# Patient Record
Sex: FEMALE | Race: OTHER | HISPANIC OR LATINO
[De-identification: names, ages, dates, MRNs, and addresses within clinical notes are randomized per-mention and may not be internally consistent; named-entity substitution may affect disease eponyms.]

---

## 2017-08-08 ENCOUNTER — APPOINTMENT (OUTPATIENT)
Dept: ORTHOPEDIC SURGERY | Facility: CLINIC | Age: 57
End: 2017-08-08
Payer: MEDICAID

## 2017-08-08 VITALS
BODY MASS INDEX: 23.92 KG/M2 | DIASTOLIC BLOOD PRESSURE: 73 MMHG | SYSTOLIC BLOOD PRESSURE: 133 MMHG | HEART RATE: 65 BPM | HEIGHT: 62 IN | WEIGHT: 130 LBS

## 2017-08-08 DIAGNOSIS — M25.571 PAIN IN RIGHT ANKLE AND JOINTS OF RIGHT FOOT: ICD-10-CM

## 2017-08-08 DIAGNOSIS — M94.279 CHONDROMALACIA, UNSPECIFIED ANKLE AND JOINTS OF FOOT: ICD-10-CM

## 2017-08-08 DIAGNOSIS — Z78.9 OTHER SPECIFIED HEALTH STATUS: ICD-10-CM

## 2017-08-08 DIAGNOSIS — M25.572 PAIN IN RIGHT ANKLE AND JOINTS OF RIGHT FOOT: ICD-10-CM

## 2017-08-08 DIAGNOSIS — M21.6X2 OTHER ACQUIRED DEFORMITIES OF LEFT FOOT: ICD-10-CM

## 2017-08-08 DIAGNOSIS — M79.672 PAIN IN RIGHT FOOT: ICD-10-CM

## 2017-08-08 DIAGNOSIS — M77.50 OTHER ENTHESOPATHY OF UNSPCFD FOOT AND ANKLE: ICD-10-CM

## 2017-08-08 DIAGNOSIS — Z56.0 UNEMPLOYMENT, UNSPECIFIED: ICD-10-CM

## 2017-08-08 DIAGNOSIS — M79.671 PAIN IN RIGHT FOOT: ICD-10-CM

## 2017-08-08 DIAGNOSIS — M21.6X1 OTHER ACQUIRED DEFORMITIES OF RIGHT FOOT: ICD-10-CM

## 2017-08-08 DIAGNOSIS — M72.2 PLANTAR FASCIAL FIBROMATOSIS: ICD-10-CM

## 2017-08-08 PROCEDURE — 99203 OFFICE O/P NEW LOW 30 MIN: CPT

## 2017-08-08 PROCEDURE — 73620 X-RAY EXAM OF FOOT: CPT | Mod: RT

## 2017-08-08 PROCEDURE — 73610 X-RAY EXAM OF ANKLE: CPT | Mod: LT

## 2017-08-08 RX ORDER — LEVOTHYROXINE SODIUM 0.17 MG/1
TABLET ORAL
Refills: 0 | Status: ACTIVE | COMMUNITY

## 2017-08-08 RX ORDER — ZOLPIDEM TARTRATE 5 MG/1
TABLET ORAL
Refills: 0 | Status: ACTIVE | COMMUNITY

## 2017-08-08 RX ORDER — NAPROXEN 500 MG/1
TABLET ORAL
Refills: 0 | Status: ACTIVE | COMMUNITY

## 2017-08-08 SDOH — ECONOMIC STABILITY - INCOME SECURITY: UNEMPLOYMENT, UNSPECIFIED: Z56.0

## 2020-02-18 ENCOUNTER — APPOINTMENT (OUTPATIENT)
Dept: RADIOLOGY | Facility: HOSPITAL | Age: 60
End: 2020-02-18
Payer: MEDICAID

## 2020-02-18 ENCOUNTER — OUTPATIENT (OUTPATIENT)
Dept: OUTPATIENT SERVICES | Facility: HOSPITAL | Age: 60
LOS: 1 days | End: 2020-02-18
Payer: MEDICAID

## 2020-02-18 DIAGNOSIS — Z00.8 ENCOUNTER FOR OTHER GENERAL EXAMINATION: ICD-10-CM

## 2020-02-18 PROCEDURE — 71046 X-RAY EXAM CHEST 2 VIEWS: CPT

## 2020-02-18 PROCEDURE — 71046 X-RAY EXAM CHEST 2 VIEWS: CPT | Mod: 26

## 2021-03-18 ENCOUNTER — APPOINTMENT (OUTPATIENT)
Dept: PULMONOLOGY | Facility: CLINIC | Age: 61
End: 2021-03-18
Payer: MEDICAID

## 2021-03-18 VITALS
DIASTOLIC BLOOD PRESSURE: 80 MMHG | TEMPERATURE: 98 F | HEART RATE: 73 BPM | RESPIRATION RATE: 16 BRPM | SYSTOLIC BLOOD PRESSURE: 130 MMHG | BODY MASS INDEX: 25.4 KG/M2 | WEIGHT: 138 LBS | OXYGEN SATURATION: 99 % | HEIGHT: 62 IN

## 2021-03-18 PROCEDURE — 99204 OFFICE O/P NEW MOD 45 MIN: CPT

## 2021-03-18 PROCEDURE — 99072 ADDL SUPL MATRL&STAF TM PHE: CPT

## 2021-03-18 NOTE — PHYSICAL EXAM
[No Acute Distress] : no acute distress [Normal Oropharynx] : normal oropharynx [No Neck Mass] : no neck mass [Normal S1, S2] : normal s1, s2 [Clear to Auscultation Bilaterally] : clear to auscultation bilaterally [No Abnormalities] : no abnormalities [No HSM] : no hsm [No Edema] : no edema [No Focal Deficits] : no focal deficits [Oriented x3] : oriented x3

## 2021-03-18 NOTE — DISCUSSION/SUMMARY
[FreeTextEntry1] : She is a 60 year-old woman. She has no prior history of pulmonary disease. She never smoked.\par \par She has been complaining of cough for the past several weeks. The cough is productive of white phlegm. She denies any shortness of breathing. She denies any fever or chills. She has been prescribed albuterol in the past which she used but never renewed. She is not sure if it helped her.\par \par Her physical examination is unremarkable. There is no evidence of any active pulmonary disease at this time. A PFT will be obtained after a nasopharyngeal swab for COVID-19-PCR has been obtained and the result is negative. Chest x-ray requested. \par \par Further recommendations to follow the results of the above. \par

## 2021-03-18 NOTE — HISTORY OF PRESENT ILLNESS
[Never] : never [TextBox_4] : She is a 60-year-old woman. She has no prior history of pulmonary disease. She never smoked.\par \par She has been complaining of cough for the past several weeks. The cough is productive of white phlegm. She denies any shortness of breathing. She denies any fever or chills. She has been prescribed albuterol in the past which she used but never renewed. She is not sure if it helped her.\par

## 2021-03-18 NOTE — REVIEW OF SYSTEMS
[Cough] : cough [Thyroid Problem] : thyroid problem [Fever] : no fever [Nasal Congestion] : no nasal congestion [Sputum] : no sputum [Dyspnea] : no dyspnea [Wheezing] : no wheezing [Chest Discomfort] : no chest discomfort [Nasal Discharge] : no nasal discharge [GERD] : no gerd [Myalgias] : no myalgias [Rash] : no rash [Headache] : no headache [Anxiety] : no anxiety [Diabetes] : no diabetes

## 2021-03-30 ENCOUNTER — APPOINTMENT (OUTPATIENT)
Dept: PULMONOLOGY | Facility: CLINIC | Age: 61
End: 2021-03-30
Payer: MEDICAID

## 2021-03-30 PROCEDURE — 99072 ADDL SUPL MATRL&STAF TM PHE: CPT

## 2021-03-30 PROCEDURE — 94729 DIFFUSING CAPACITY: CPT

## 2021-03-30 PROCEDURE — 94727 GAS DIL/WSHOT DETER LNG VOL: CPT

## 2021-03-30 PROCEDURE — 94060 EVALUATION OF WHEEZING: CPT

## 2021-04-01 ENCOUNTER — APPOINTMENT (OUTPATIENT)
Dept: RADIOLOGY | Facility: HOSPITAL | Age: 61
End: 2021-04-01
Payer: MEDICAID

## 2021-04-01 ENCOUNTER — RESULT REVIEW (OUTPATIENT)
Age: 61
End: 2021-04-01

## 2021-04-01 ENCOUNTER — APPOINTMENT (OUTPATIENT)
Dept: PULMONOLOGY | Facility: CLINIC | Age: 61
End: 2021-04-01

## 2021-04-01 ENCOUNTER — OUTPATIENT (OUTPATIENT)
Dept: OUTPATIENT SERVICES | Facility: HOSPITAL | Age: 61
LOS: 1 days | End: 2021-04-01
Payer: MEDICAID

## 2021-04-01 DIAGNOSIS — R05 COUGH: ICD-10-CM

## 2021-04-01 PROCEDURE — 71046 X-RAY EXAM CHEST 2 VIEWS: CPT

## 2021-04-01 PROCEDURE — 71046 X-RAY EXAM CHEST 2 VIEWS: CPT | Mod: 26

## 2021-05-06 ENCOUNTER — APPOINTMENT (OUTPATIENT)
Dept: PULMONOLOGY | Facility: CLINIC | Age: 61
End: 2021-05-06
Payer: MEDICAID

## 2021-05-06 VITALS
BODY MASS INDEX: 25.42 KG/M2 | HEART RATE: 72 BPM | DIASTOLIC BLOOD PRESSURE: 64 MMHG | OXYGEN SATURATION: 99 % | WEIGHT: 139 LBS | SYSTOLIC BLOOD PRESSURE: 124 MMHG | TEMPERATURE: 97.1 F

## 2021-05-06 DIAGNOSIS — R05 COUGH: ICD-10-CM

## 2021-05-06 PROCEDURE — 99213 OFFICE O/P EST LOW 20 MIN: CPT

## 2021-05-06 PROCEDURE — 99072 ADDL SUPL MATRL&STAF TM PHE: CPT

## 2021-05-06 NOTE — PHYSICAL EXAM
[No Acute Distress] : no acute distress [No Neck Mass] : no neck mass [Normal S1, S2] : normal s1, s2 [Clear to Auscultation Bilaterally] : clear to auscultation bilaterally [No Abnormalities] : no abnormalities [No HSM] : no hsm [No Edema] : no edema [No Focal Deficits] : no focal deficits [Oriented x3] : oriented x3 [No Resp Distress] : no resp distress

## 2021-05-06 NOTE — HISTORY OF PRESENT ILLNESS
[Never] : never [TextBox_4] : She is a 60 year-old woman. She has no prior history of pulmonary disease. She never smoked.\par \par She is no longer coughing. No SOB or wheezing. \par \par Not on any inhaler.  \par

## 2021-05-06 NOTE — PROCEDURE
[FreeTextEntry1] : PFT 3/30/21: Spirometry was normal. Lung volumes were normal. Diffusion capacity was normal. No significant improvement after bronchodilator. \par \par Chest x-ray 4/1/21: No active disease.

## 2021-05-06 NOTE — REVIEW OF SYSTEMS
[Thyroid Problem] : thyroid problem [Fever] : no fever [Chills] : no chills [Nasal Congestion] : no nasal congestion [Cough] : no cough [Chest Tightness] : no chest tightness [Dyspnea] : no dyspnea [Sputum] : no sputum [Wheezing] : no wheezing [Chest Discomfort] : no chest discomfort [Nasal Discharge] : no nasal discharge [GERD] : no gerd [Myalgias] : no myalgias [Rash] : no rash [Headache] : no headache [Anxiety] : no anxiety [Diabetes] : no diabetes

## 2021-07-27 ENCOUNTER — TRANSCRIPTION ENCOUNTER (OUTPATIENT)
Age: 61
End: 2021-07-27

## 2021-07-29 ENCOUNTER — EMERGENCY (EMERGENCY)
Facility: HOSPITAL | Age: 61
LOS: 1 days | Discharge: HOME | End: 2021-07-29
Attending: EMERGENCY MEDICINE | Admitting: EMERGENCY MEDICINE
Payer: MEDICAID

## 2021-07-29 VITALS
TEMPERATURE: 98 F | WEIGHT: 139.99 LBS | HEIGHT: 61 IN | OXYGEN SATURATION: 97 % | RESPIRATION RATE: 16 BRPM | DIASTOLIC BLOOD PRESSURE: 52 MMHG | HEART RATE: 80 BPM | SYSTOLIC BLOOD PRESSURE: 110 MMHG

## 2021-07-29 LAB
B PERT DNA SPEC QL NAA+PROBE: SIGNIFICANT CHANGE UP
C PNEUM DNA SPEC QL NAA+PROBE: SIGNIFICANT CHANGE UP
FLUAV H1 2009 PAND RNA SPEC QL NAA+PROBE: SIGNIFICANT CHANGE UP
FLUAV H1 RNA SPEC QL NAA+PROBE: SIGNIFICANT CHANGE UP
FLUAV H3 RNA SPEC QL NAA+PROBE: SIGNIFICANT CHANGE UP
FLUAV SUBTYP SPEC NAA+PROBE: SIGNIFICANT CHANGE UP
FLUBV RNA SPEC QL NAA+PROBE: SIGNIFICANT CHANGE UP
HADV DNA SPEC QL NAA+PROBE: SIGNIFICANT CHANGE UP
HCOV PNL SPEC NAA+PROBE: SIGNIFICANT CHANGE UP
HMPV RNA SPEC QL NAA+PROBE: SIGNIFICANT CHANGE UP
HPIV1 RNA SPEC QL NAA+PROBE: SIGNIFICANT CHANGE UP
HPIV2 RNA SPEC QL NAA+PROBE: SIGNIFICANT CHANGE UP
HPIV3 RNA SPEC QL NAA+PROBE: SIGNIFICANT CHANGE UP
HPIV4 RNA SPEC QL NAA+PROBE: SIGNIFICANT CHANGE UP
RAPID RVP RESULT: SIGNIFICANT CHANGE UP
RSV RNA SPEC QL NAA+PROBE: SIGNIFICANT CHANGE UP
RV+EV RNA SPEC QL NAA+PROBE: SIGNIFICANT CHANGE UP
SARS-COV-2 RNA SPEC QL NAA+PROBE: SIGNIFICANT CHANGE UP

## 2021-07-29 PROCEDURE — 99283 EMERGENCY DEPT VISIT LOW MDM: CPT | Mod: 25

## 2021-07-29 PROCEDURE — 0225U NFCT DS DNA&RNA 21 SARSCOV2: CPT

## 2021-07-29 PROCEDURE — 71045 X-RAY EXAM CHEST 1 VIEW: CPT | Mod: 26

## 2021-07-29 PROCEDURE — 71045 X-RAY EXAM CHEST 1 VIEW: CPT

## 2021-07-29 PROCEDURE — 99284 EMERGENCY DEPT VISIT MOD MDM: CPT

## 2021-07-29 RX ORDER — FAMOTIDINE 10 MG/ML
1 INJECTION INTRAVENOUS
Qty: 10 | Refills: 0
Start: 2021-07-29 | End: 2021-08-02

## 2021-07-29 RX ADMIN — Medication 100 MILLIGRAM(S): at 14:00

## 2021-07-29 NOTE — ED ADULT NURSE NOTE - OBJECTIVE STATEMENT
Pt presents to ED from home for cough. Pt states she has had a cough for 2 weeks that is not improving. She recently was tested for covid yesterday, which was negative. Denies chest pain or SOB.

## 2021-07-29 NOTE — ED PROVIDER NOTE - OBJECTIVE STATEMENT
pt 62 yo woman. non smoker. pt complaining of cough for the past several weeks. intermittent white phlegm. She denies any shortness of breathing. denies any fever or chills, cp, weakness. She has been prescribed albuterol in the past which she used but it didn't work well.  as per sunrise pt has been to pulmonology twice for cough as recent as 1 month ago. pt also had rapid neg covid test today

## 2021-07-29 NOTE — ED PROVIDER NOTE - CLINICAL SUMMARY MEDICAL DECISION MAKING FREE TEXT BOX
pt 62 yo woman. non smoker. pt complaining of cough for the past several weeks. intermittent white phlegm. She denies any shortness of breathing. denies any fever or chills, cp, weakness. She has been prescribed albuterol in the past which she used but it didn't work well.  as per sunrise pt has been to pulmonology twice for cough as recent as 1 month ago. pt also had rapid neg covid test today  unremarkable exam  will fu pulmonologist

## 2021-07-29 NOTE — ED PROVIDER NOTE - PATIENT PORTAL LINK FT
You can access the FollowMyHealth Patient Portal offered by [facility] by registering at the following website: [email or web address]. By joining [facility]’s FollowMyHealth portal, you will also be able to view your health information using other applications (apps) compatible with our system.

## 2021-07-29 NOTE — ED PROVIDER NOTE - NSFOLLOWUPINSTRUCTIONS_ED_ALL_ED_FT
Follow up with your pulmonologist  Take cough pills and pepcid as prescribed  Any worsening of symptoms or new concerning symptoms return to the ED      Cough    Coughing is a reflex that clears your throat and your airways. Coughing helps to heal and protect your lungs. It is normal to cough occasionally, but a cough that happens with other symptoms or lasts a long time may be a sign of a condition that needs treatment. Coughing may be caused by infections, asthma or COPD, smoking, postnasal drip, gastroesophageal reflux, as well as other medical conditions. Take medicines only as instructed by your health care provider. Avoid environments or triggers that causes you to cough at work or at home.    SEEK IMMEDIATE MEDICAL CARE IF YOU HAVE ANY OF THE FOLLOWING SYMPTOMS: coughing up blood, shortness of breath, rapid heart rate, chest pain, unexplained weight loss or night sweats.

## 2021-09-07 NOTE — DISCUSSION/SUMMARY
[FreeTextEntry1] : She is a 60 year-old woman. She has no prior history of pulmonary disease. She never smoked.\par \par She had been complaining of cough for the past several weeks. The cough has resolved. \par \par Her physical examination remains unremarkable. \par \par There is no evidence of any active pulmonary disease at this time. PFT and CXR were normal.  \par \par Recieved the COVID 19 vaccine. She works as a home attendant. \par \par Will see again as needed. \par  - - -

## 2022-05-03 NOTE — ED PROVIDER NOTE - CONDITION AT DISCHARGE:
Xray results back yet from last Thursday at [facility] [address]? Improved

## 2022-12-01 ENCOUNTER — RX ONLY (RX ONLY)
Age: 62
End: 2022-12-01

## 2022-12-01 ENCOUNTER — OFFICE (OUTPATIENT)
Dept: URBAN - METROPOLITAN AREA CLINIC 90 | Facility: CLINIC | Age: 62
Setting detail: OPHTHALMOLOGY
End: 2022-12-01
Payer: COMMERCIAL

## 2022-12-01 DIAGNOSIS — H16.223: ICD-10-CM

## 2022-12-01 DIAGNOSIS — H11.043: ICD-10-CM

## 2022-12-01 DIAGNOSIS — H25.13: ICD-10-CM

## 2022-12-01 PROBLEM — H01.004 BLEPHARITIS; RIGHT UPPER LID, RIGHT LOWER LID, LEFT UPPER LID, LEFT LOWER LID: Status: ACTIVE | Noted: 2022-12-01

## 2022-12-01 PROBLEM — H01.002 BLEPHARITIS; RIGHT UPPER LID, RIGHT LOWER LID, LEFT UPPER LID, LEFT LOWER LID: Status: ACTIVE | Noted: 2022-12-01

## 2022-12-01 PROBLEM — H01.001 BLEPHARITIS; RIGHT UPPER LID, RIGHT LOWER LID, LEFT UPPER LID, LEFT LOWER LID: Status: ACTIVE | Noted: 2022-12-01

## 2022-12-01 PROBLEM — H01.005 BLEPHARITIS; RIGHT UPPER LID, RIGHT LOWER LID, LEFT UPPER LID, LEFT LOWER LID: Status: ACTIVE | Noted: 2022-12-01

## 2022-12-01 PROCEDURE — 92002 INTRM OPH EXAM NEW PATIENT: CPT | Performed by: OPHTHALMOLOGY

## 2022-12-01 ASSESSMENT — TONOMETRY
OS_IOP_MMHG: 12
OD_IOP_MMHG: 12

## 2022-12-01 ASSESSMENT — AXIALLENGTH_DERIVED
OD_AL: 22.3141
OS_AL: 22.2248

## 2022-12-01 ASSESSMENT — TEAR BREAK UP TIME (TBUT)
OS_TBUT: T 1+
OD_TBUT: T 1+

## 2022-12-01 ASSESSMENT — CORNEAL PTERYGIUM
OS_PTERYGIUM: NASAL
OD_PTERYGIUM: NASAL 1MM

## 2022-12-01 ASSESSMENT — KERATOMETRY
OD_K1POWER_DIOPTERS: 45.50
OS_K1POWER_DIOPTERS: 45.75
OS_AXISANGLE_DEGREES: 096
OD_AXISANGLE_DEGREES: 079
OS_K2POWER_DIOPTERS: 46.50
OD_K2POWER_DIOPTERS: 47.00

## 2022-12-01 ASSESSMENT — SPHEQUIV_DERIVED
OS_SPHEQUIV: 1.25
OD_SPHEQUIV: 0.875

## 2022-12-01 ASSESSMENT — REFRACTION_AUTOREFRACTION
OS_SPHERE: +1.50
OD_AXIS: 176
OD_SPHERE: +1.25
OS_AXIS: 011
OS_CYLINDER: -0.50
OD_CYLINDER: -0.75

## 2022-12-01 ASSESSMENT — REFRACTION_CURRENTRX
OD_ADD: +2.25
OS_ADD: +2.25
OD_SPHERE: +0.50
OD_OVR_VA: 20/
OS_SPHERE: +0.75
OS_CYLINDER: -0.75
OD_CYLINDER: -1.00
OS_AXIS: 179
OD_AXIS: 178
OS_OVR_VA: 20/

## 2022-12-01 ASSESSMENT — LID EXAM ASSESSMENTS
OS_BLEPHARITIS: LLL LUL 2+ 3+
OD_BLEPHARITIS: RLL RUL 2+ 3+

## 2022-12-01 ASSESSMENT — VISUAL ACUITY
OS_BCVA: 20/25
OD_BCVA: 20/25

## 2022-12-01 ASSESSMENT — CONFRONTATIONAL VISUAL FIELD TEST (CVF)
OS_FINDINGS: FULL
OD_FINDINGS: FULL

## 2023-04-12 NOTE — ED ADULT TRIAGE NOTE - AS TEMP SITE
CC:  Pro Cruz is here today for MWV and knee injury.      Medications: medications verified and updated  Refills needed today?  YES  denies Latex allergy or sensitivity  Tobacco history: verified  Health Maintenance Due   Topic Date Due   • Hepatitis C Screening  Never done   • Medicare Advantage- Medicare Wellness Visit  Never done     Patient is due for the topics as listed above and wishes to proceed with them.  Patient would like communication of messages and results via:        Cell Phone:   Telephone Information:   Mobile [phone]     Okay to leave a message containing results? Yes     Has [employer] form been filled out?  Yes     Has the patient completed Advanced Directives? Yes      forehead